# Patient Record
(demographics unavailable — no encounter records)

---

## 2024-11-03 NOTE — ASSESSMENT
[FreeTextEntry1] : -  JOMAR is an 11y 10m female with hx of 2 episodes of significant vulvar swelling  From description these sound like allergic reactions of some kind  Fortunately, her symptoms have completely resolved   The area of concern that mom indicates as still being "swollen" is actually the labia minora -- the appearance of which is completely normal for pt's age & pubertal stage Mom expressed that the area "did not look like that before"  Counseled on the wide range of normal labial lengths & appearance Reassurance provided that there is *no* swelling and that her exam today shows appropriate development   Plan & recommendations shared with patient/family as below.  All questions were answered, and understanding was expressed. They were encouraged to contact us with additional questions or concerns.   Mihaela Shah MD Director, Pediatric & Adolescent Gynecology [facility] Office: [phone]

## 2024-11-03 NOTE — PLAN
[FreeTextEntry1] : -  Thank you for seeing us today!  Referral:  - Pediatric Allergy and Immunology    Labs:  - Please have labs performed. We will contact you with results and any new recommendations   Tracking: - Track menstrual periods & symptoms on a calendar or phone sarabjit (such as [employer], [email or web address]) - Please contact us if you have: -- very heavy bleeding (soaking a pad/tampon in 1-2 hours for 3-4 hours) -- bleeding (more than spotting) for >10 days -- severe pain with periods that is not relieved by pain medication -- (if not taking hormonal medication) periods are occurring too frequently (<21 days apart) or infrequently (>60 days apart)    Follow-up: as needed   To contact the Pediatric & Adolescent Gynecology team: - phone: [phone] -- option 1 for appointments, option 2 for clinical questions - patient portal (available for ages <13 or 18+ through [employer] sarabjit/website) - email: [email or web address] (for non-urgent requests/records)   Appointment locations: - Mondays and Thursdays: [address], [address], [facility] [address] ([facility]) - Wednesdays: [address], [address]tr[address], [facility] [address] (on Google Maps: [facility] at [place])

## 2024-11-03 NOTE — HISTORY OF PRESENT ILLNESS
[FreeTextEntry1] : Pediatric & Adolescent Gynecology: New Patient Visit   JOMAR is a(n) 11 year old female ( Dec 16 2012) presenting for vulvar swelling and irritation.    Referred by: none PCP: AGUSTINA GARCIA   Review of history from records: Went to ED 10/06/24 for vaginal swelling x1 day  After showering, continued to have vaginal irritation and progressively worsening swelling  Per mom, pt had a similar episode in 2022 On exam, noted to have bilateral labial swelling (no lesions or lacerations)  Pt found to have elevated eosinophils so possible allergic contact dermatitis Advised use of ice packs, continue steroids (Prednisone) and Benadryl as needed    Today 10/21/2024: pt is here with mom  Periods started 2024  Mom said Jomar has had it 4 or 5 times since then  Jomar says she does not have heavy bleeding or pain   Mom said Jomar did not have her period while the swelling incident happened  Not sure if maybe she had it right before  Mom said that she thinks Jomar still has swelling  Mom said nothing new was used by Jomar prior to the swelling starting  Mom said that this incident of the swelling was much worse than the first time   The first time (2022) they were told it may have been an allergic contact dermatitis this time presented similarly, but was much more severe -- probably 3 times the size as last time  Mom notes that there was both vulvar & vaginal swelling and that the "inside was coming out" (almost like tissue coming out) and that the opening of the vagina "appeared to be twisted"  Things are not back to baseline yet   No other allergy / atopic conditions  However:  - she will wake up w/ a swollen bruised-looking eye, randomly; has happened 5-6 times without apparent trigger - she gets oral canker sores quite often; however mom notes she puts a lot of things in her mouth (necklace, toys; not nails anymore)  other medical:  - Chiari malformation; follows w/ Neurosurgery at [place]; has not needed any interventions  - she has very large lymph nodes in her neck, that have been monitored (have not noticed them elsewhere); for about 1.5 years; her pediatrician has been following   Menstrual history: Menarche 11 years old (2024)  Cycles: regular - monthly so far  Duration of periods: 5 days  Flow: Day 2 is on the heavier side  Period products: pads, regular  - Heaviest - will change at least 9-10 times (NOT fully soaked) - this is actually an improvement from before!  - Mom said she goes through boxes of pads (thinks she used more than 80 pads her first period)  Cramps: no cramps with periods; does not take anything   LMP  to Oct 2  -- the swelling episode happened on 10/6    There were no new irritants, no exposures, medications, foods  nothing was different in their routine in relation to the episode she was also perfectly fine prior to that then had irritation late at night - took a shower - felt the swelling got worse over time  she came out of bathroom crying - not b/c of pain but b/c she was scared  mom went to buy her PO Benadryl from pharmacy  by the time mom came home, vulva had doubled in size   Mom notes that the vulva had the appearance of lymphedema (mom has personal hx of this secondary to breast cancer) -- almost as though there was a lot of fluid underneath the thinned skin   mom gave her Benadryl and they went to the ED  there she was given IV dexamethasone and prescribed PO steroid to continue  (mom says she wasn't able to start this until 4 days later, which meant "she was 1 day uncovered" as she had gotten IV steroid prior to that)   Currently:  - no longer has pain/itching -- that actually resolved pretty quickly  - however mom continued to reiterate that she "still has swelling" -- it started decreasing about 2-3 days after ED (prior to that, had trouble sitting down)  Mom states "it looked perfect there" before (referring to genital area)

## 2024-11-14 NOTE — HISTORY OF PRESENT ILLNESS
[de-identified] : Swollen lip [FreeTextEntry6] : Seen in 2022 and again had episode of swollen vagina which they felt was an allergic contact dermatitis and responded to antihistamine and steroids.  She was also seen 2022 by Peds AI and angioedema ruled out. Happened again in 10/24 and went to ER and gave IV Benadryl and steroids.  Last night after having chicken clementine-A and within 30 mins had a swollen upper lip and felt itchy. Mother gave Benadryl and it resolved quite a bit. Started about 4 days ago with sore throat and stuffy and runny nose. Afebrile. Started to have mostly hacky cough that she feels in her throat. Has HAS on and off.  No ear pain or pressure.  Still with sore throat.  No CP/SOB. No SA/V/D/C/loose stools. No one else sick at home. Possible contacts at school.

## 2024-11-14 NOTE — REVIEW OF SYSTEMS
[Malaise] : malaise [Headache] : headache [Nasal Discharge] : nasal discharge [Nasal Congestion] : nasal congestion [Sore Throat] : sore throat [Cough] : cough [Negative] : Musculoskeletal

## 2024-11-14 NOTE — DISCUSSION/SUMMARY
[FreeTextEntry1] : 12 y/o F with Swollen upper lip- allergic reaction/Acute URI/Pharyngitis/Cough/Congestion- Quick Strep negative T/C sent Orapred 15 ml given. Peds AI referral for further workup and possible angioedema w/u. Flonase nasal spray 2 sprays each nostril 1x/day. May use Mucinex DM or Advil cold and sinus as needed. Claritin or Zyrtec daily until swelling goes down and not itchy/Benadryl at bedtime if needed. Increase clear fluids/ Honey/ Ices/Smoothies/Soups/Probiotics/Tylenol and/or Motrin as needed. Ques addressed. Mother/Taylor verbalize understanding. Check back if symptoms do not improve or worsen or if any questions/concerns. Time spent patient/chart - 35 mins.

## 2024-11-14 NOTE — PHYSICAL EXAM
[Alert] : alert [EOMI] : grossly EOMI [Clear] : right tympanic membrane clear [Clear Rhinorrhea] : clear rhinorrhea [Erythematous Oropharynx] : erythematous oropharynx [Supple] : supple [Clear to Auscultation Bilaterally] : clear to auscultation bilaterally [Regular Rate and Rhythm] : regular rate and rhythm [Soft] : soft [Normal Bowel Sounds] : normal bowel sounds [No Abnormal Lymph Nodes Palpated] : no abnormal lymph nodes palpated [Moves All Extremities x 4] : moves all extremities x4 [Normotonic] : normotonic [Acute Distress] : no acute distress [Wheezing] : no wheezing [Rhonchi] : no rhonchi [Tender] : nontender [Distended] : nondistended [Hepatosplenomegaly] : no hepatosplenomegaly [de-identified] : PN mucus [de-identified] : swollen upper lip- improved from last night after Benadryl

## 2025-02-20 NOTE — PHYSICAL EXAM
[Erythematous Oropharynx] : erythematous oropharynx [Enlarged Tonsils] : enlarged tonsils [Enlarged] : enlarged [Anterior Cervical] : anterior cervical [Posterior Cervical] : posterior cervical [NL] : moves all extremities x4, warm, well perfused x4 [FreeTextEntry4] : nasal congestion, deviated nasal septum [de-identified] : blister right 2nd and 3 rd finger

## 2025-02-20 NOTE — DISCUSSION/SUMMARY
[FreeTextEntry1] : 11 yo female with deviated nasal septum, chronic nasal congestion, acute strep pharyngitis, reactive cervical lymphadenopathy. Start Cefdinir (250 mg/5 ml) 12 ml QD x 10 d.  Flonase Q HS. Increase fluids, rest, saline rinse for nose, humidifier, gargle, lozenges, tea with honey, Tylenol or Motrin PRN.  Benadryl PRN postnasal drip at night.  Call if symptoms change or worsen.

## 2025-02-20 NOTE — DISCUSSION/SUMMARY
[FreeTextEntry1] : 13 yo female with deviated nasal septum, chronic nasal congestion, acute strep pharyngitis, reactive cervical lymphadenopathy. Start Cefdinir (250 mg/5 ml) 12 ml QD x 10 d.  Flonase Q HS. Increase fluids, rest, saline rinse for nose, humidifier, gargle, lozenges, tea with honey, Tylenol or Motrin PRN.  Benadryl PRN postnasal drip at night.  Call if symptoms change or worsen.

## 2025-02-20 NOTE — HISTORY OF PRESENT ILLNESS
[de-identified] : swollen lymph nodes [FreeTextEntry6] : Seen by Dr. Cline for allergic reaction- she has had rashes, hives- made apt with another allergist for second opinion- waiting to do testing.   Over weekend LNs in neck bothering her, they were palpable more than usual.  They did not go down on their own.  US in past done on LNs were normal.  A few weeks ago had body aches and chills and cough.  This weekend LNs sensitive feels they are there ,even when touching them.  No rashes.  Pt has chronic nasal congestion, stuffy nose.   No fever, body aches or chills.  No fatigue.  No HA, no runny or stuffy nose, nl sense or smell and taste.  No ST, no cough, no SOB or chest pain.  No SA, no N/V/D.  Nl po, nl sleep.  No rash.  Chronic nasal congestion.  Sore throat when mouth breathing at night.

## 2025-02-20 NOTE — HISTORY OF PRESENT ILLNESS
[de-identified] : swollen lymph nodes [FreeTextEntry6] : Seen by Dr. Cline for allergic reaction- she has had rashes, hives- made apt with another allergist for second opinion- waiting to do testing.   Over weekend LNs in neck bothering her, they were palpable more than usual.  They did not go down on their own.  US in past done on LNs were normal.  A few weeks ago had body aches and chills and cough.  This weekend LNs sensitive feels they are there ,even when touching them.  No rashes.  Pt has chronic nasal congestion, stuffy nose.   No fever, body aches or chills.  No fatigue.  No HA, no runny or stuffy nose, nl sense or smell and taste.  No ST, no cough, no SOB or chest pain.  No SA, no N/V/D.  Nl po, nl sleep.  No rash.  Chronic nasal congestion.  Sore throat when mouth breathing at night.

## 2025-02-20 NOTE — REVIEW OF SYSTEMS
[Headache] : no headache [Nasal Congestion] : nasal congestion [Sore Throat] : sore throat [Enlarged Lymph Nodes] : enlarged lymph nodes [Tender Lymph Nodes] : tender lymph nodes [Negative] : Genitourinary

## 2025-02-20 NOTE — PHYSICAL EXAM
[Erythematous Oropharynx] : erythematous oropharynx [Enlarged Tonsils] : enlarged tonsils [Enlarged] : enlarged [Anterior Cervical] : anterior cervical [Posterior Cervical] : posterior cervical [NL] : moves all extremities x4, warm, well perfused x4 [FreeTextEntry4] : nasal congestion, deviated nasal septum [de-identified] : blister right 2nd and 3 rd finger

## 2025-03-17 NOTE — HISTORY OF PRESENT ILLNESS
[Yes] : Patient goes to dentist yearly [Toothpaste] : Primary Fluoride Source: Toothpaste [Needs Immunizations] : Needs immunizations [Eats meals with family] : eats meals with family [Has family members/adults to turn to for help] : has family members/adults to turn to for help [Is permitted and is able to make independent decisions] : Is permitted and is able to make independent decisions [Grade: ____] : Grade: [unfilled] [Normal Performance] : normal performance [Normal Behavior/Attention] : normal behavior/attention [Normal Homework] : normal homework [Eats regular meals including adequate fruits and vegetables] : eats regular meals including adequate fruits and vegetables [Drinks non-sweetened liquids] : drinks non-sweetened liquids  [Calcium source] : calcium source [Has friends] : has friends [At least 1 hour of physical activity a day] : at least 1 hour of physical activity a day [Uses safety belts/safety equipment] : uses safety belts/safety equipment  [Has peer relationships free of violence] : has peer relationships free of violence [No] : Patient has not had sexual intercourse [Has ways to cope with stress] : has ways to cope with stress [Displays self-confidence] : displays self-confidence [NO] : No [Normal] : normal [LMP: _____] : LMP: [unfilled] [Days of Bleeding: _____] : Days of bleeding: [unfilled] [Age of Menarche: ____] : Age of Menarche: [unfilled] [With Teen] : teen [Sleep Concerns] : no sleep concerns [Has concerns about body or appearance] : does not have concerns about body or appearance [Screen time (except homework) less than 2 hours a day] : no screen time (except homework) less than 2 hours a day [Has interests/participates in community activities/volunteers] : does not have interests/participates in community activities/volunteers [Uses electronic nicotine delivery system] : does not use electronic nicotine delivery system [Exposure to electronic nicotine delivery system] : no exposure to electronic nicotine delivery system [Uses tobacco] : does not use tobacco [Exposure to tobacco] : no exposure to tobacco [Uses drugs] : does not use drugs  [Exposure to drugs] : no exposure to drugs [Drinks alcohol] : does not drink alcohol [Exposure to alcohol] : no exposure to alcohol [Has problems with sleep] : does not have problems with sleep [Gets depressed, anxious, or irritable/has mood swings] : does not get depressed, anxious, or irritable/has mood swings [Has thought about hurting self or considered suicide] : has not thought about hurting self or considered suicide [de-identified] : Men A [de-identified] : 11 Pm -7 AM [de-identified] : LVMS [de-identified] : Basketball, Jazz, Tap, Ballet [FreeTextEntry1] : Follows with Neurology at [place] q6 months for MRI of the brain to monitor Chiari malformation (may 2024 last one). Mother reports that she has one cervical and one thoracic syrinx as well. Due for visit soon with neurology.    Saw Gyn in Dec 2024: 11y 10m female with hx of 2 episodes of significant vulvar swelling. From description these sound like allergic reactions of some kind. Exam normal. Has not been able to see allergist yet.

## 2025-03-17 NOTE — PHYSICAL EXAM
[Alert] : alert [No Acute Distress] : no acute distress [Normocephalic] : normocephalic [EOMI Bilateral] : EOMI bilateral [Clear tympanic membranes with bony landmarks and light reflex present bilaterally] : clear tympanic membranes with bony landmarks and light reflex present bilaterally  [Pink Nasal Mucosa] : pink nasal mucosa [Nonerythematous Oropharynx] : nonerythematous oropharynx [Supple, full passive range of motion] : supple, full passive range of motion [No Palpable Masses] : no palpable masses [Clear to Auscultation Bilaterally] : clear to auscultation bilaterally [Regular Rate and Rhythm] : regular rate and rhythm [Normal S1, S2 audible] : normal S1, S2 audible [No Murmurs] : no murmurs [+2 Femoral Pulses] : +2 femoral pulses [Soft] : soft [NonTender] : non tender [Non Distended] : non distended [Normoactive Bowel Sounds] : normoactive bowel sounds [No Hepatomegaly] : no hepatomegaly [No Splenomegaly] : no splenomegaly [Estuardo: ____] : Estuardo [unfilled] [Estuardo: _____] : Estuardo [unfilled] [Normal Muscle Tone] : normal muscle tone [No Gait Asymmetry] : no gait asymmetry [No pain or deformities with palpation of bone, muscles, joints] : no pain or deformities with palpation of bone, muscles, joints [Straight] : straight [+2 Patella DTR] : +2 patella DTR [Cranial Nerves Grossly Intact] : cranial nerves grossly intact [No Rash or Lesions] : no rash or lesions [de-identified] : Lt posterior cervical LN, 2, mobile and nontender

## 2025-03-17 NOTE — DISCUSSION/SUMMARY
[Normal Growth] : growth [Normal Development] : development  [No Elimination Concerns] : elimination [Continue Regimen] : feeding [No Skin Concerns] : skin [Normal Sleep Pattern] : sleep [None] : no medical problems [Anticipatory Guidance Given] : Anticipatory guidance addressed as per the history of present illness section [Physical Growth and Development] : physical growth and development [Social and Academic Competence] : social and academic competence [Emotional Well-Being] : emotional well-being [Risk Reduction] : risk reduction [Violence and Injury Prevention] : violence and injury prevention [No Medications] : ~He/She~ is not on any medications [Patient] : patient [Parent/Guardian] : Parent/Guardian [Full Activity without restrictions including Physical Education & Athletics] : Full Activity without restrictions including Physical Education & Athletics [FreeTextEntry6] : Men A [FreeTextEntry1] : 12 year old patient here for well child visit. No concerns today with growth and development.   Anticipatory guidance discussed with patient and mother regarding - good sleep hygiene for age (8-10 hours per night and no screens while in bed)  - dental hygiene (brushing teeth 2x/day and every 6 months-yearly dentist visits)  - well rounded/balanced diet and eating meals with family  - maintaining an active lifestyle with at least 30-60 minutes of physical activity daily and encouraged engagement in team physical activity  - emotional well-being (dealing with stress and anxiety)  - Limit screen time to no more than 2 hours per day.  - Discussed safety and avoidance of alcohol/drugs/nicotine.  - Discussed importance of safe sexual practices for future sexual encounters.  - Help child to maintain consistent daily routines and sleep schedule.   Men A vaccine today. HPV vaccine to be done at later visits.  Routine blood to be performed.  Reviewed PHQ9 and CRAFFT questionnaires. Patient not at risk based off of negative screenings   Return 1 year for routine well adolescent check.

## 2025-03-17 NOTE — HISTORY OF PRESENT ILLNESS
[Yes] : Patient goes to dentist yearly [Toothpaste] : Primary Fluoride Source: Toothpaste [Needs Immunizations] : Needs immunizations [Eats meals with family] : eats meals with family [Has family members/adults to turn to for help] : has family members/adults to turn to for help [Is permitted and is able to make independent decisions] : Is permitted and is able to make independent decisions [Grade: ____] : Grade: [unfilled] [Normal Performance] : normal performance [Normal Behavior/Attention] : normal behavior/attention [Normal Homework] : normal homework [Eats regular meals including adequate fruits and vegetables] : eats regular meals including adequate fruits and vegetables [Drinks non-sweetened liquids] : drinks non-sweetened liquids  [Calcium source] : calcium source [Has friends] : has friends [At least 1 hour of physical activity a day] : at least 1 hour of physical activity a day [Uses safety belts/safety equipment] : uses safety belts/safety equipment  [Has peer relationships free of violence] : has peer relationships free of violence [No] : Patient has not had sexual intercourse [Has ways to cope with stress] : has ways to cope with stress [Displays self-confidence] : displays self-confidence [NO] : No [Normal] : normal [LMP: _____] : LMP: [unfilled] [Days of Bleeding: _____] : Days of bleeding: [unfilled] [Age of Menarche: ____] : Age of Menarche: [unfilled] [With Teen] : teen [Sleep Concerns] : no sleep concerns [Has concerns about body or appearance] : does not have concerns about body or appearance [Screen time (except homework) less than 2 hours a day] : no screen time (except homework) less than 2 hours a day [Has interests/participates in community activities/volunteers] : does not have interests/participates in community activities/volunteers [Uses electronic nicotine delivery system] : does not use electronic nicotine delivery system [Exposure to electronic nicotine delivery system] : no exposure to electronic nicotine delivery system [Uses tobacco] : does not use tobacco [Exposure to tobacco] : no exposure to tobacco [Uses drugs] : does not use drugs  [Exposure to drugs] : no exposure to drugs [Drinks alcohol] : does not drink alcohol [Exposure to alcohol] : no exposure to alcohol [Has problems with sleep] : does not have problems with sleep [Gets depressed, anxious, or irritable/has mood swings] : does not get depressed, anxious, or irritable/has mood swings [Has thought about hurting self or considered suicide] : has not thought about hurting self or considered suicide [de-identified] : Men A [de-identified] : 11 Pm -7 AM [de-identified] : LVMS [de-identified] : Basketball, Jazz, Tap, Ballet [FreeTextEntry1] : Follows with Neurology at [place] q6 months for MRI of the brain to monitor Chiari malformation (may 2024 last one). Mother reports that she has one cervical and one thoracic syrinx as well. Due for visit soon with neurology.    Saw Gyn in Dec 2024: 11y 10m female with hx of 2 episodes of significant vulvar swelling. From description these sound like allergic reactions of some kind. Exam normal. Has not been able to see allergist yet.

## 2025-03-17 NOTE — PHYSICAL EXAM
[Alert] : alert [No Acute Distress] : no acute distress [Normocephalic] : normocephalic [EOMI Bilateral] : EOMI bilateral [Clear tympanic membranes with bony landmarks and light reflex present bilaterally] : clear tympanic membranes with bony landmarks and light reflex present bilaterally  [Pink Nasal Mucosa] : pink nasal mucosa [Nonerythematous Oropharynx] : nonerythematous oropharynx [Supple, full passive range of motion] : supple, full passive range of motion [No Palpable Masses] : no palpable masses [Clear to Auscultation Bilaterally] : clear to auscultation bilaterally [Regular Rate and Rhythm] : regular rate and rhythm [Normal S1, S2 audible] : normal S1, S2 audible [No Murmurs] : no murmurs [+2 Femoral Pulses] : +2 femoral pulses [Soft] : soft [NonTender] : non tender [Non Distended] : non distended [Normoactive Bowel Sounds] : normoactive bowel sounds [No Hepatomegaly] : no hepatomegaly [No Splenomegaly] : no splenomegaly [Estuardo: ____] : Estuardo [unfilled] [Estuardo: _____] : Estuardo [unfilled] [Normal Muscle Tone] : normal muscle tone [No Gait Asymmetry] : no gait asymmetry [No pain or deformities with palpation of bone, muscles, joints] : no pain or deformities with palpation of bone, muscles, joints [Straight] : straight [+2 Patella DTR] : +2 patella DTR [Cranial Nerves Grossly Intact] : cranial nerves grossly intact [No Rash or Lesions] : no rash or lesions [de-identified] : Lt posterior cervical LN, 2, mobile and nontender